# Patient Record
Sex: MALE | Race: WHITE | URBAN - METROPOLITAN AREA
[De-identification: names, ages, dates, MRNs, and addresses within clinical notes are randomized per-mention and may not be internally consistent; named-entity substitution may affect disease eponyms.]

---

## 2019-05-10 ENCOUNTER — OFFICE VISIT (OUTPATIENT)
Dept: CARDIOLOGY CLINIC | Age: 49
End: 2019-05-10

## 2019-05-10 VITALS
DIASTOLIC BLOOD PRESSURE: 80 MMHG | HEIGHT: 71 IN | SYSTOLIC BLOOD PRESSURE: 130 MMHG | HEART RATE: 80 BPM | BODY MASS INDEX: 32.2 KG/M2 | WEIGHT: 230 LBS | OXYGEN SATURATION: 98 % | RESPIRATION RATE: 16 BRPM

## 2019-05-10 DIAGNOSIS — E78.5 DYSLIPIDEMIA: ICD-10-CM

## 2019-05-10 DIAGNOSIS — R06.09 DOE (DYSPNEA ON EXERTION): ICD-10-CM

## 2019-05-10 DIAGNOSIS — I10 BENIGN ESSENTIAL HYPERTENSION: ICD-10-CM

## 2019-05-10 DIAGNOSIS — R53.83 FATIGUE, UNSPECIFIED TYPE: ICD-10-CM

## 2019-05-10 DIAGNOSIS — I25.10 CORONARY ARTERY DISEASE INVOLVING NATIVE CORONARY ARTERY OF NATIVE HEART WITHOUT ANGINA PECTORIS: Primary | ICD-10-CM

## 2019-05-10 RX ORDER — LORATADINE 10 MG/1
10 TABLET ORAL
COMMUNITY

## 2019-05-10 RX ORDER — MONTELUKAST SODIUM 10 MG/1
10 TABLET ORAL DAILY
COMMUNITY
End: 2021-02-09 | Stop reason: SDUPTHER

## 2019-05-10 RX ORDER — SIMVASTATIN 20 MG/1
TABLET, FILM COATED ORAL
COMMUNITY
End: 2020-05-28 | Stop reason: SDUPTHER

## 2019-05-10 RX ORDER — ASPIRIN 81 MG/1
TABLET ORAL DAILY
COMMUNITY

## 2019-05-10 RX ORDER — AMLODIPINE BESYLATE 5 MG/1
5 TABLET ORAL DAILY
COMMUNITY
End: 2020-05-28 | Stop reason: SDUPTHER

## 2019-05-10 RX ORDER — LEVOTHYROXINE SODIUM 25 UG/1
37.5 TABLET ORAL
COMMUNITY
End: 2020-05-28 | Stop reason: SDUPTHER

## 2019-05-10 RX ORDER — OLOPATADINE HYDROCHLORIDE 1 MG/ML
2 SOLUTION/ DROPS OPHTHALMIC 2 TIMES DAILY
COMMUNITY
End: 2020-05-28

## 2019-05-10 NOTE — PROGRESS NOTES
[facility] [address]: Yvonne Houser  [phone]  Requesting/referring provider: Dr. Madina Garay  Reason for Consult: CAD    HPI: Nawaf Duenas, a 57y.o. year-old who presents for evaluation of CAD. He walks daily, No activity limitations. He is very worried about the CAD but no severe chest pain. Discussed CA scoring and risk stratification based on age and score. Overall score is not too bad but he is extremely high risk due to his young age. Discussed stress testing for lipid rich obstructive plaque and medical treatment with aspirin and statin therapy given his high 10 year risk for MI. Mild SCHWARZ with exertion, high anxiety over cad. Assessment/Plan:  1. CAD EBCT with score 241 95%ile ([id number] LCx 1.2)  -aspirin and statin  -stress echo for evaluation of obstructive disease  2. FHx early CAD dad w CABG at 55  3. Dyslipidemia- , HDL 43 last check, goal <70, and would check particle analysis for further investigation. 4. HTN- At goal on current medications. 5. Hypothyroid- on repletion  6. Body mass index is 32.08 kg/m². work on diet and exercise, once safe to start exercise after stress testing. FHx mother at 67, CAD,CHF, leg amputation due to PAD, dad with early onset CAD  Soc no tob in the last 21 years. 3-4 beers a day  He  has no past medical history on file. Cardiovascular ROS: no chest pain or palpitations. Respiratory ROS: positive for - shortness of breath  Neurological ROS: no TIA or stroke symptoms  All other systems negative except as above. PE  Vitals:    05/10/19 1340   BP: 130/80   Pulse: 80   Resp: 16   SpO2: 98%   Weight: 230 lb (104.3 kg)   Height: 5' 11\" (1.803 m)    Body mass index is 32.08 kg/m².    General appearance - alert, well appearing, and in no distress  Mental status - affect appropriate to mood  Eyes - sclera anicteric, moist mucous membranes  Neck - supple, no significant adenopathy  Lymphatics - no  lymphadenopathy  Chest - clear to auscultation, no wheezes, rales or rhonchi  Heart - normal rate, regular rhythm, normal S1, S2, no murmurs, rubs, clicks or gallops  Abdomen - soft, nontender, nondistended, no masses or organomegaly  Back exam - full range of motion, no tenderness  Neurological - cranial nerves II through XII grossly intact, no focal deficit  Musculoskeletal - no muscular tenderness noted, normal strength  Extremities - peripheral pulses normal, no pedal edema  Skin - normal coloration  no rashes    Recent Labs:  No results found for: CHOL, CHOLX, CHLST, CHOLV, 587873, HDL, LDL, LDLC, DLDLP, TGLX, TRIGL, TRIGP, CHHD, CHHDX  No results found for: JONA, CREAPOC, ACREA, CREA, REFC3, REFC4  No results found for: BUN, BUNPOC, IBUN, MBUNV, BUNV  No results found for: K, KI, PLK, WBK  No results found for: HBA1C, HGBE8, SSY0ETLC  No results found for: HGBPOC, HGB, HGBP, HGBEXT  No results found for: PLT, PLTEXT    Reviewed:  No past medical history on file. Social History     Tobacco Use   Smoking Status Former Smoker   Smokeless Tobacco Never Used     Social History     Substance and Sexual Activity   Alcohol Use Yes    Frequency: 4 or more times a week    Drinks per session: 3 or 4     No Known Allergies    No current outpatient medications on file. No current facility-administered medications for this visit.         Jyoti Serna MD  [facility] and V[facility] [place]  [address] [facility]  [address]

## 2019-05-31 ENCOUNTER — TELEPHONE (OUTPATIENT)
Dept: CARDIOLOGY CLINIC | Age: 49
End: 2019-05-31

## 2020-05-28 ENCOUNTER — VIRTUAL VISIT (OUTPATIENT)
Dept: CARDIOLOGY CLINIC | Age: 50
End: 2020-05-28

## 2020-05-28 VITALS
SYSTOLIC BLOOD PRESSURE: 128 MMHG | DIASTOLIC BLOOD PRESSURE: 86 MMHG | HEIGHT: 71 IN | WEIGHT: 229 LBS | BODY MASS INDEX: 32.06 KG/M2 | HEART RATE: 67 BPM

## 2020-05-28 DIAGNOSIS — Z82.49 FAMILY HISTORY OF EARLY CAD: ICD-10-CM

## 2020-05-28 DIAGNOSIS — I10 BENIGN ESSENTIAL HYPERTENSION: ICD-10-CM

## 2020-05-28 DIAGNOSIS — E78.5 DYSLIPIDEMIA: ICD-10-CM

## 2020-05-28 DIAGNOSIS — I25.10 CORONARY ARTERY DISEASE INVOLVING NATIVE CORONARY ARTERY OF NATIVE HEART WITHOUT ANGINA PECTORIS: Primary | ICD-10-CM

## 2020-05-28 DIAGNOSIS — R93.1 ELEVATED CORONARY ARTERY CALCIUM SCORE: ICD-10-CM

## 2020-05-28 NOTE — PROGRESS NOTES
[facility] [address]: Jason Mathews  [phone]  Requesting/referring provider: Dr. Susana Weinstein  Reason for Consult: CAD    HPI: Rakan Angel, a 52y.o. year-old who presents for evaluation of CAD. He walks daily, No activity limitations. He is very worried about the CAD but no severe chest pain. Discussed CA scoring and risk stratification based on age and score. Overall score is not too bad but he is extremely high risk due to his young age. Discussed stress testing for lipid rich obstructive plaque and medical treatment with aspirin and statin therapy given his high 10 year risk for MI. Mild SCHWARZ with exertion, high anxiety over cad. He gave up fast food and doing better with his eating. Walking 30 minutes a day every day. Watching his activity on the [employer] watch. Sleeping well, good energy. Stress level is ok, he is retired x 2 years, but still worrying sometimes. Gets up, walks, goes out to the store or shopping, visits his mom etc.   Bp is doing well, tracking it on an kelvin, runing well. LFT up last last visit and he will follow-up with Dr. Susana Weinstein to check it. Weight is stable. Assessment/Plan:  1. CAD EBCT with score 241 95%ile (UIE140 LCx 1.2)  -aspirin and statin  -stress echo for evaluation of obstructive disease  2. FHx early CAD dad w CABG at 55  3. Dyslipidemia- , HDL 43 last check, goal <70, and would check particle analysis for further investigation. 4. HTN- At goal on current medications. 5. Hypothyroid- on repletion  6. Body mass index is 31.94 kg/m². work on diet and exercise, once safe to start exercise after stress testing. FHx mother at 67, CAD,CHF, leg amputation due to PAD, dad with early onset CAD  Soc no tob in the last 21 years. 3-4 beers a day  He  has a past medical history of Environmental allergies, HTN (hypertension), Hypercholesteremia, and Hypothyroidism. Cardiovascular ROS: no chest pain or palpitations.    Respiratory ROS: positive for - shortness of breath  Neurological ROS: no TIA or stroke symptoms  All other systems negative except as above. PE  Vitals:    20 0934   BP: 128/86   Pulse: 67   Weight: 229 lb (103.9 kg)   Height: 5' 11\" (1.803 m)    Body mass index is 31.94 kg/m². General appearance - alert, well appearing, and in no distress  Mental status - affect appropriate to mood  Eyes - sclera anicteric, moist mucous membranes  Neurological - cranial nerves II through XII grossly intact, no focal deficit  Skin - normal coloration  no rashes    Recent Labs:  Lab Results   Component Value Date/Time    Cholesterol, total 160 2020 02:05 PM    HDL Cholesterol 39 (L) 2020 02:05 PM    LDL, calculated 92 2020 02:05 PM    Triglyceride 147 2020 02:05 PM     Lab Results   Component Value Date/Time    Creatinine 1.14 2020 02:05 PM     Lab Results   Component Value Date/Time    BUN 15 2020 02:05 PM     Lab Results   Component Value Date/Time    Potassium 4.5 2020 02:05 PM     No results found for: HBA1C, HGBE8, VUF3XIRW, WKA7AJQE  Lab Results   Component Value Date/Time    HGB (POC) 14.7 2019 02:11 PM    HGB 14.7 2019 12:59 PM     Lab Results   Component Value Date/Time    PLATELET 561  12:59 PM       Reviewed:  Past Medical History:   Diagnosis Date    Environmental allergies     HTN (hypertension)     Hypercholesteremia     Hypothyroidism      Social History     Tobacco Use   Smoking Status Former Smoker    Types: Cigarettes    Last attempt to quit: 2004    Years since quittin.3   Smokeless Tobacco Never Used     Social History     Substance and Sexual Activity   Alcohol Use Yes    Frequency: 4 or more times a week    Drinks per session: 3 or 4     No Known Allergies    Current Outpatient Medications   Medication Sig    amLODIPine (NORVASC) 5 mg tablet Take 1 Tab by mouth daily.     levothyroxine (SYNTHROID) 25 mcg tablet Take 1.5 Tabs by mouth Daily (before breakfast).  simvastatin (ZOCOR) 20 mg tablet Take 1 Tab by mouth nightly.  montelukast (SINGULAIR) 10 mg tablet Take 10 mg by mouth daily.  aspirin delayed-release 81 mg tablet Take  by mouth daily.  loratadine (CLARITIN) 10 mg tablet Take 10 mg by mouth. No current facility-administered medications for this visit. Nathan Schwab MD  [facility] heart and Vascular [place]  [address] [facility], [address] [place] [address]          VIRTUAL VISIT DOCUMENTATION     Pursuant to the emergency declaration under the [address] [place] [address] [facility] authority and the [employer] Act, this Virtual  Visit was conducted, with patient's consent, to reduce the patient's risk of exposure to COVID-19 and provide continuity of care for an established patient. Services were provided through a video synchronous discussion virtually to substitute for in-person clinic visit. CHIEF COMPLAINT      Wilber Simon is a 52 y.o. male who was seen by synchronous (real-time) audio-video technology on 5/28/2020. Patient is being seen today for CAD       ASSESSMENT             PLAN       We discussed the expected course, resolution and complications of the diagnosis(es) in detail. Medication risks, benefits, costs, interactions, and alternatives were discussed as indicated. I advised him to contact the office if his condition worsens, changes or fails to improve as anticipated. He expressed understanding with the diagnosis(es) and plan    HISTORY OF PRESENTING ILLNESS      Wilber Simon is a 52 y.o. male        ACTIVE PROBLEM LIST     There are no active problems to display for this patient.           PAST MEDICAL HISTORY     Past Medical History:   Diagnosis Date    Environmental allergies     HTN (hypertension)     Hypercholesteremia     Hypothyroidism            PAST SURGICAL HISTORY     Past Surgical History: Procedure Laterality Date    HX OTHER SURGICAL      testicle removal- for severe inflammation- no cancer          ALLERGIES     No Known Allergies       FAMILY HISTORY     Family History   Problem Relation Age of Onset    Heart Disease Mother     Cancer Mother         lymphoma    Heart Disease Father 61    negative for cardiac disease       SOCIAL HISTORY     Social History     Socioeconomic History    Marital status:      Spouse name: Not on file    Number of children: Not on file    Years of education: Not on file    Highest education level: Not on file   Tobacco Use    Smoking status: Former Smoker     Types: Cigarettes     Last attempt to quit: 2004     Years since quittin.3    Smokeless tobacco: Never Used   Substance and Sexual Activity    Alcohol use: Yes     Frequency: 4 or more times a week     Drinks per session: 3 or 4    Drug use: Never   Social History Narrative    ** Merged History Encounter **              MEDICATIONS     Current Outpatient Medications   Medication Sig    amLODIPine (NORVASC) 5 mg tablet Take 1 Tab by mouth daily.  levothyroxine (SYNTHROID) 25 mcg tablet Take 1.5 Tabs by mouth Daily (before breakfast).  simvastatin (ZOCOR) 20 mg tablet Take 1 Tab by mouth nightly.  montelukast (SINGULAIR) 10 mg tablet Take 10 mg by mouth daily.  aspirin delayed-release 81 mg tablet Take  by mouth daily.  loratadine (CLARITIN) 10 mg tablet Take 10 mg by mouth. No current facility-administered medications for this visit. I have reviewed the nurses notes, vitals, problem list, allergy list, medical history, family, social history and medications. REVIEW OF SYMPTOMS     Constitutional: Negative for fever, chills, malaise/fatigue and diaphoresis. Respiratory: Negative for cough, hemoptysis, sputum production, shortness of breath and wheezing.    Cardiovascular: Negative for chest pain, palpitations, orthopnea, claudication, leg swelling and PND.  Gastrointestinal: Negative for heartburn, nausea, vomiting, blood in stool and melena. Genitourinary: Negative for dysuria and flank pain. Musculoskeletal: Negative for joint pain and back pain. Skin: Negative for rash. Neurological: Negative for focal weakness, seizures, loss of consciousness, weakness and headaches. Endo/Heme/Allergies: Negative for abnormal bleeding. Psychiatric/Behavioral: Negative for memory loss. PHYSICAL EXAMINATION      Due to this being a TeleHealth evaluation, many elements of the physical examination are unable to be assessed. General: Well developed, in no acute distress, cooperative and alert  HEENT: Pupils equal/round. No marked JVD visible on video. Respiratory: No audible wheezing, no signs of respiratory distress, lips non cyanotic  Extremities:  No edema  Neuro: A&Ox3, speech clear, no facial droop, answering questions appropriately  Skin: Skin color is normal. No rashes or lesions. Non diaphoretic on visible skin during exam       DIAGNOSTIC DATA      No specialty comments available. LABORATORY DATA      Lab Results   Component Value Date/Time    WBC 5.8 08/19/2019 12:59 PM    HGB (POC) 14.7 02/05/2019 02:11 PM    HGB 14.7 08/19/2019 12:59 PM    HCT (POC) 42.7 02/05/2019 02:11 PM    HCT 44.1 08/19/2019 12:59 PM    PLATELET 584 32/25/2349 12:59 PM    MCV 91 08/19/2019 12:59 PM      Lab Results   Component Value Date/Time    Sodium 143 02/04/2020 02:05 PM    Potassium 4.5 02/04/2020 02:05 PM    Chloride 102 02/04/2020 02:05 PM    CO2 22 02/04/2020 02:05 PM    Glucose 94 02/04/2020 02:05 PM    BUN 15 02/04/2020 02:05 PM    Creatinine 1.14 02/04/2020 02:05 PM    BUN/Creatinine ratio 13 02/04/2020 02:05 PM    GFR est AA 88 02/04/2020 02:05 PM    GFR est non-AA 76 02/04/2020 02:05 PM    Calcium 9.4 02/04/2020 02:05 PM    Bilirubin, total 0.5 02/04/2020 02:05 PM    Alk.  phosphatase 87 02/04/2020 02:05 PM    Protein, total 7.5 02/04/2020 02:05 PM    Albumin 4.9 02/04/2020 02:05 PM    A-G Ratio 1.9 02/04/2020 02:05 PM    ALT (SGPT) 53 (H) 02/04/2020 02:05 PM             FOLLOW-UP            Patient was made aware and verbalized understanding that an appointment will be scheduled for them for a virtual visit and/or office visit within the above time frame. Patient understanding his/her responsibility to call and change time/date if he/she so chooses. Thank you, Yesenia Brown MD for allowing me to participate in the care of Abdirahman Jacob. Please do not hesitate to contact me for further questions/concerns. Greater than 20 minutes was spent in direct video patient care, planning and chart review. This visit was conducted using [employer] telemedicine services.        Luz Gold MD    [address]        [phone] / [phone] Fax       [facility] [address] [place] [address]  [facility]  [phone] / [phone] Fax

## 2020-06-01 NOTE — PATIENT INSTRUCTIONS
MD Fabian Almanzar  
  
   
  
1 year fu vv mwf In office visit, not virtual visit Future Appointments Date Time Provider Savanah Juana 5/17/2021  1:00 PM Jerry Bennett  [address]

## 2021-05-17 ENCOUNTER — VIRTUAL VISIT (OUTPATIENT)
Dept: CARDIOLOGY CLINIC | Age: 51
End: 2021-05-17
Payer: COMMERCIAL

## 2021-05-17 DIAGNOSIS — I10 BENIGN ESSENTIAL HYPERTENSION: ICD-10-CM

## 2021-05-17 DIAGNOSIS — I25.10 CORONARY ARTERY DISEASE INVOLVING NATIVE CORONARY ARTERY OF NATIVE HEART WITHOUT ANGINA PECTORIS: ICD-10-CM

## 2021-05-17 DIAGNOSIS — R93.1 ELEVATED CORONARY ARTERY CALCIUM SCORE: Primary | ICD-10-CM

## 2021-05-17 DIAGNOSIS — Z82.49 FAMILY HISTORY OF EARLY CAD: ICD-10-CM

## 2021-05-17 DIAGNOSIS — E78.5 DYSLIPIDEMIA: ICD-10-CM

## 2021-05-17 PROCEDURE — 99213 OFFICE O/P EST LOW 20 MIN: CPT | Performed by: INTERNAL MEDICINE

## 2021-05-17 RX ORDER — MONTELUKAST SODIUM 10 MG/1
10 TABLET ORAL DAILY
COMMUNITY
End: 2022-02-15 | Stop reason: SDUPTHER

## 2021-05-17 NOTE — PATIENT INSTRUCTIONS
Wadell Siemens, MD Katheryne Erb, RN  
  
   
  
One year fuv with stress echo for cad and pre visit cmp mag nmr Lab slip mailed to patient. Future Appointments Date Time Provider Savanah Arias 5/17/2022  9:00 AM ESME GOMEZ [facility]  
5/17/2022  9:00 AM STRESSECHOESME [facility]  
5/17/2022 10:00 AM Wadell Siemens M, MD CAVREY [facility] sent to patient.

## 2021-05-17 NOTE — PROGRESS NOTES
[facility] [place]: Augustin Donovan  [phone]  Requesting/referring provider: Dr. Christiano Chavez  Reason for Consult: CAD    HPI: Eren Jerome, a 52y.o. year-old who presents for evaluation of CAD. He walks daily, No activity limitations. He is very worried about the CAD but no severe chest pain. Discussed CA scoring and risk stratification based on age and score. Overall score is not too bad but he is extremely high risk due to his young age. Discussed stress testing for lipid rich obstructive plaque and medical treatment with aspirin and statin therapy given his high 10 year risk for MI. Mild SCHWARZ with exertion, high anxiety over cad. He gave up fast food and doing better with his eating. Walking 30 minutes a day every day. Watching his activity on the Apple watch. Sleeping well, good energy. Stress level is ok, he is retired x 2 years, but still worrying sometimes. Gets up, walks, goes out to the store or shopping, visits his mom etc.   Bp is doing well, tracking it on an kelvin, runing well. LFT up last last visit and he will follow-up with Dr. Christiano Chavez to check it. Weight is stable. LDL 88 HDL 49   2/21  Apple watch rules his life 432 days in a row all three rings  30 min a day, walking mostly, no strength training. Just house chores. He was donating blood and checking it there but hasn't done that recently    Assessment/Plan:  1. CAD EBCT with score 241 95%ile (HKD392 LCx 1.2)  -aspirin and statin  -stress echo for evaluation of obstructive disease  2. FHx early CAD dad w CABG at 55  3. Dyslipidemia- improving, near goal <70, and would check particle analysis for further investigation with next labs  4. HTN- At goal on current medications. 5. Hypothyroid- on repletion  6. There is no height or weight on file to calculate BMI. work on diet and exercise, once safe to start exercise after stress testing.      FHx mother at 67, CAD,CHF, leg amputation due to PAD, dad with early onset CAD  Brother and sister without issues so far. Soc no tob in the last 20 years. 3-4 beers a day  He  has a past medical history of Environmental allergies, HTN (hypertension), Hypercholesteremia, and Hypothyroidism. Cardiovascular ROS: no chest pain or palpitations. Respiratory ROS: positive for - shortness of breath  Neurological ROS: no TIA or stroke symptoms  All other systems negative except as above. PE  There were no vitals filed for this visit. There is no height or weight on file to calculate BMI.    General appearance - alert, well appearing, and in no distress  Mental status - affect appropriate to mood  Eyes - sclera anicteric, moist mucous membranes  Neurological - cranial nerves II through XII grossly intact, no focal deficit  Skin - normal coloration  no rashes    Recent Labs:  Lab Results   Component Value Date/Time    Cholesterol, total 161 2021 09:34 AM    HDL Cholesterol 49 2021 09:34 AM    LDL, calculated 88 2021 09:34 AM    LDL, calculated 78 2020 11:00 AM    Triglyceride 135 2021 09:34 AM     Lab Results   Component Value Date/Time    Creatinine 1.07 2021 09:34 AM     Lab Results   Component Value Date/Time    BUN 12 2021 09:34 AM     Lab Results   Component Value Date/Time    Potassium 4.1 2021 09:34 AM     No results found for: HBA1C, HGBE8, IHQ6SHHK, ZYR1ORTG  Lab Results   Component Value Date/Time    HGB (POC) 14.7 2019 02:11 PM    HGB 14.1 2021 09:34 AM     Lab Results   Component Value Date/Time    PLATELET 983  09:34 AM       Reviewed:  Past Medical History:   Diagnosis Date    Environmental allergies     HTN (hypertension)     Hypercholesteremia     Hypothyroidism      Social History     Tobacco Use   Smoking Status Former Smoker    Types: Cigarettes    Quit date: 2004    Years since quittin.2   Smokeless Tobacco Never Used     Social History     Substance and Sexual Activity   Alcohol Use Yes    Frequency: 4 or more times a week    Drinks per session: 3 or 4     No Known Allergies    Current Outpatient Medications   Medication Sig    montelukast (Singulair) 10 mg tablet Take 10 mg by mouth daily.  azelastine (OPTIVAR) 0.05 % ophthalmic solution Administer 1 Drop to both eyes two (2) times daily as needed (allergic conjunctivitis). Use in affected eye(s)    amLODIPine (NORVASC) 5 mg tablet Take 1 Tab by mouth daily.  levothyroxine (SYNTHROID) 25 mcg tablet Take 1.5 Tabs by mouth Daily (before breakfast).  simvastatin (ZOCOR) 20 mg tablet Take 1 Tab by mouth nightly.  aspirin delayed-release 81 mg tablet Take  by mouth daily.  loratadine (CLARITIN) 10 mg tablet Take 10 mg by mouth. No current facility-administered medications for this visit. Alexus Thomas MD  [facility] [place]  [facility] [facility], [address] [place] [address]          VIRTUAL VISIT DOCUMENTATION     Pursuant to the emergency declaration under the [address] [place] [address], [facility] waiver authority and the [employer] Act, this Virtual  Visit was conducted, with patient's consent, to reduce the patient's risk of exposure to COVID-19 and provide continuity of care for an established patient. Services were provided through a video synchronous discussion virtually to substitute for in-person clinic visit. CHIEF COMPLAINT      Rashid Mcnally is a 52 y.o. male who was seen by synchronous (real-time) audio-video technology on 5/17/2021. Patient is being seen today for CAD       ASSESSMENT             PLAN       We discussed the expected course, resolution and complications of the diagnosis(es) in detail. Medication risks, benefits, costs, interactions, and alternatives were discussed as indicated.   I advised him to contact the office if his condition worsens, changes or fails to improve as anticipated. He expressed understanding with the diagnosis(es) and plan    HISTORY OF PRESENTING ILLNESS      Lara Kong is a 52 y.o. male        ACTIVE PROBLEM LIST     Patient Active Problem List    Diagnosis Date Noted    Family history of early CAD 2020    Benign essential hypertension 2020    Coronary artery disease involving native coronary artery of native heart without angina pectoris 2020    Dyslipidemia 2020           PAST MEDICAL HISTORY     Past Medical History:   Diagnosis Date    Environmental allergies     HTN (hypertension)     Hypercholesteremia     Hypothyroidism            PAST SURGICAL HISTORY     Past Surgical History:   Procedure Laterality Date    HX OTHER SURGICAL      testicle removal- for severe inflammation- no cancer          ALLERGIES     No Known Allergies       FAMILY HISTORY     Family History   Problem Relation Age of Onset    Heart Disease Mother     Cancer Mother         lymphoma    Heart Disease Father 61    negative for cardiac disease       SOCIAL HISTORY     Social History     Socioeconomic History    Marital status:      Spouse name: Not on file    Number of children: Not on file    Years of education: Not on file    Highest education level: Not on file   Tobacco Use    Smoking status: Former Smoker     Types: Cigarettes     Quit date: 2004     Years since quittin.2    Smokeless tobacco: Never Used   Substance and Sexual Activity    Alcohol use: Yes     Frequency: 4 or more times a week     Drinks per session: 3 or 4    Drug use: Never   Social History Narrative    ** Merged History Encounter **              MEDICATIONS     Current Outpatient Medications   Medication Sig    montelukast (Singulair) 10 mg tablet Take 10 mg by mouth daily.  azelastine (OPTIVAR) 0.05 % ophthalmic solution Administer 1 Drop to both eyes two (2) times daily as needed (allergic conjunctivitis).  Use in affected eye(s)    amLODIPine (NORVASC) 5 mg tablet Take 1 Tab by mouth daily.  levothyroxine (SYNTHROID) 25 mcg tablet Take 1.5 Tabs by mouth Daily (before breakfast).  simvastatin (ZOCOR) 20 mg tablet Take 1 Tab by mouth nightly.  aspirin delayed-release 81 mg tablet Take  by mouth daily.  loratadine (CLARITIN) 10 mg tablet Take 10 mg by mouth. No current facility-administered medications for this visit. I have reviewed the nurses notes, vitals, problem list, allergy list, medical history, family, social history and medications. REVIEW OF SYMPTOMS     Constitutional: Negative for fever, chills, malaise/fatigue and diaphoresis. Respiratory: Negative for cough, hemoptysis, sputum production, shortness of breath and wheezing. Cardiovascular: Negative for chest pain, palpitations, orthopnea, claudication, leg swelling and PND. Gastrointestinal: Negative for heartburn, nausea, vomiting, blood in stool and melena. Genitourinary: Negative for dysuria and flank pain. Musculoskeletal: Negative for joint pain and back pain. Skin: Negative for rash. Neurological: Negative for focal weakness, seizures, loss of consciousness, weakness and headaches. Endo/Heme/Allergies: Negative for abnormal bleeding. Psychiatric/Behavioral: Negative for memory loss. PHYSICAL EXAMINATION      Due to this being a TeleHealth evaluation, many elements of the physical examination are unable to be assessed. General: Well developed, in no acute distress, cooperative and alert  HEENT: Pupils equal/round. No marked JVD visible on video. Respiratory: No audible wheezing, no signs of respiratory distress, lips non cyanotic  Extremities:  No edema  Neuro: A&Ox3, speech clear, no facial droop, answering questions appropriately  Skin: Skin color is normal. No rashes or lesions. Non diaphoretic on visible skin during exam       DIAGNOSTIC DATA      No specialty comments available.        LABORATORY DATA      Lab Results   Component Value Date/Time    WBC 5.8 02/09/2021 09:34 AM    HGB (POC) 14.7 02/05/2019 02:11 PM    HGB 14.1 02/09/2021 09:34 AM    HCT (POC) 42.7 02/05/2019 02:11 PM    HCT 41.1 02/09/2021 09:34 AM    PLATELET 335 32/49/7455 09:34 AM    MCV 93 02/09/2021 09:34 AM      Lab Results   Component Value Date/Time    Sodium 140 02/09/2021 09:34 AM    Potassium 4.1 02/09/2021 09:34 AM    Chloride 103 02/09/2021 09:34 AM    CO2 22 02/09/2021 09:34 AM    Glucose 101 (H) 02/09/2021 09:34 AM    BUN 12 02/09/2021 09:34 AM    Creatinine 1.07 02/09/2021 09:34 AM    BUN/Creatinine ratio 11 02/09/2021 09:34 AM    GFR est AA 94 02/09/2021 09:34 AM    GFR est non-AA 82 02/09/2021 09:34 AM    Calcium 9.6 02/09/2021 09:34 AM    Bilirubin, total 0.6 02/09/2021 09:34 AM    Alk. phosphatase 93 02/09/2021 09:34 AM    Protein, total 7.6 02/09/2021 09:34 AM    Albumin 4.6 02/09/2021 09:34 AM    A-G Ratio 1.5 02/09/2021 09:34 AM    ALT (SGPT) 53 (H) 02/09/2021 09:34 AM             FOLLOW-UP            Patient was made aware and verbalized understanding that an appointment will be scheduled for them for a virtual visit and/or office visit within the above time frame. Patient understanding his/her responsibility to call and change time/date if he/she so chooses. Thank you, Joi Bay MD for allowing me to participate in the care of Hortencia Postin. Please do not hesitate to contact me for further questions/concerns. Greater than 20 minutes was spent in direct video patient care, planning and chart review. This visit was conducted using [employer] telemedicine services.        Jess Perdomo MD    [place] [address]        [phone] / [phone] Fax       [address] [place] [address]  Shellie Hoffman  [phone] / [phone] Fax

## 2022-03-18 PROBLEM — I10 BENIGN ESSENTIAL HYPERTENSION: Status: ACTIVE | Noted: 2020-05-28

## 2022-03-19 PROBLEM — I25.10 CORONARY ARTERY DISEASE INVOLVING NATIVE CORONARY ARTERY OF NATIVE HEART WITHOUT ANGINA PECTORIS: Status: ACTIVE | Noted: 2020-05-28

## 2022-03-19 PROBLEM — Z82.49 FAMILY HISTORY OF EARLY CAD: Status: ACTIVE | Noted: 2020-05-28

## 2022-03-19 PROBLEM — E78.5 DYSLIPIDEMIA: Status: ACTIVE | Noted: 2020-05-28

## 2023-05-17 RX ORDER — LORATADINE 10 MG/1
10 TABLET ORAL
COMMUNITY

## 2023-05-17 RX ORDER — ASPIRIN 81 MG/1
TABLET ORAL DAILY
COMMUNITY

## 2023-05-17 RX ORDER — LEVOTHYROXINE SODIUM 0.03 MG/1
37.5 TABLET ORAL
COMMUNITY
Start: 2023-02-10

## 2023-05-17 RX ORDER — AZELASTINE HYDROCHLORIDE 0.5 MG/ML
1 SOLUTION/ DROPS OPHTHALMIC 2 TIMES DAILY PRN
COMMUNITY
Start: 2023-02-10

## 2023-05-17 RX ORDER — MONTELUKAST SODIUM 10 MG/1
10 TABLET ORAL DAILY
COMMUNITY
Start: 2023-02-10